# Patient Record
Sex: FEMALE | Race: WHITE | URBAN - METROPOLITAN AREA
[De-identification: names, ages, dates, MRNs, and addresses within clinical notes are randomized per-mention and may not be internally consistent; named-entity substitution may affect disease eponyms.]

---

## 2020-08-04 ENCOUNTER — TELEPHONE (OUTPATIENT)
Dept: OBSTETRICS AND GYNECOLOGY | Facility: CLINIC | Age: 45
End: 2020-08-04

## 2020-08-04 DIAGNOSIS — R30.0 DYSURIA: Primary | ICD-10-CM

## 2020-08-04 RX ORDER — SULFAMETHOXAZOLE AND TRIMETHOPRIM 400; 80 MG/1; MG/1
1 TABLET ORAL 2 TIMES DAILY
Qty: 14 TABLET | Refills: 0 | Status: SHIPPED | OUTPATIENT
Start: 2020-08-04 | End: 2020-08-11

## 2020-08-04 RX ORDER — PHENAZOPYRIDINE HYDROCHLORIDE 200 MG/1
200 TABLET, FILM COATED ORAL 3 TIMES DAILY PRN
Qty: 6 TABLET | Refills: 0 | Status: SHIPPED | OUTPATIENT
Start: 2020-08-04 | End: 2021-08-04

## 2021-08-09 ENCOUNTER — HISTORICAL (OUTPATIENT)
Dept: ADMINISTRATIVE | Facility: HOSPITAL | Age: 46
End: 2021-08-09

## 2021-08-09 LAB
ABS NEUT (OLG): 2.8 X10(3)/MCL (ref 2.1–9.2)
ALBUMIN SERPL-MCNC: 4 GM/DL (ref 3.5–5)
ALBUMIN/GLOB SERPL: 1.4 RATIO (ref 1.1–2)
ALP SERPL-CCNC: 67 UNIT/L (ref 40–150)
ALT SERPL-CCNC: 14 UNIT/L (ref 0–55)
APPEARANCE, UA: CLEAR
AST SERPL-CCNC: 16 UNIT/L (ref 5–34)
BACTERIA SPEC CULT: NORMAL /HPF
BASOPHILS # BLD AUTO: 0 X10(3)/MCL (ref 0–0.2)
BASOPHILS NFR BLD AUTO: 1 %
BILIRUB SERPL-MCNC: 0.5 MG/DL
BILIRUB UR QL STRIP: NEGATIVE
BILIRUBIN DIRECT+TOT PNL SERPL-MCNC: 0.2 MG/DL (ref 0–0.5)
BILIRUBIN DIRECT+TOT PNL SERPL-MCNC: 0.3 MG/DL (ref 0–0.8)
BUN SERPL-MCNC: 17.8 MG/DL (ref 7–18.7)
CALCIUM SERPL-MCNC: 9.3 MG/DL (ref 8.4–10.2)
CHLORIDE SERPL-SCNC: 105 MMOL/L (ref 98–107)
CHOLEST SERPL-MCNC: 206 MG/DL
CHOLEST/HDLC SERPL: 4 {RATIO} (ref 0–5)
CO2 SERPL-SCNC: 27 MMOL/L (ref 22–29)
COLOR UR: YELLOW
CREAT SERPL-MCNC: 0.75 MG/DL (ref 0.55–1.02)
DEPRECATED CALCIDIOL+CALCIFEROL SERPL-MC: 40.6 NG/ML (ref 30–80)
EOSINOPHIL # BLD AUTO: 0.1 X10(3)/MCL (ref 0–0.9)
EOSINOPHIL NFR BLD AUTO: 2 %
ERYTHROCYTE [DISTWIDTH] IN BLOOD BY AUTOMATED COUNT: 12.5 % (ref 11.5–17)
EST. AVERAGE GLUCOSE BLD GHB EST-MCNC: 111.2 MG/DL
GLOBULIN SER-MCNC: 2.8 GM/DL (ref 2.4–3.5)
GLUCOSE (UA): NEGATIVE
GLUCOSE SERPL-MCNC: 91 MG/DL (ref 74–100)
HBA1C MFR BLD: 5.5 %
HCT VFR BLD AUTO: 45.9 % (ref 37–47)
HDLC SERPL-MCNC: 53 MG/DL (ref 35–60)
HGB BLD-MCNC: 15.1 GM/DL (ref 12–16)
HGB UR QL STRIP: NEGATIVE
KETONES UR QL STRIP: NEGATIVE
LDLC SERPL CALC-MCNC: 132 MG/DL (ref 50–140)
LEUKOCYTE ESTERASE UR QL STRIP: NEGATIVE
LYMPHOCYTES # BLD AUTO: 1.5 X10(3)/MCL (ref 0.6–4.6)
LYMPHOCYTES NFR BLD AUTO: 32 %
MCH RBC QN AUTO: 32.1 PG (ref 27–31)
MCHC RBC AUTO-ENTMCNC: 32.9 GM/DL (ref 33–36)
MCV RBC AUTO: 97.7 FL (ref 80–94)
MONOCYTES # BLD AUTO: 0.4 X10(3)/MCL (ref 0.1–1.3)
MONOCYTES NFR BLD AUTO: 7 %
NEUTROPHILS # BLD AUTO: 2.8 X10(3)/MCL (ref 2.1–9.2)
NEUTROPHILS NFR BLD AUTO: 58 %
NITRITE UR QL STRIP: NEGATIVE
PH UR STRIP: 5 [PH] (ref 5–9)
PLATELET # BLD AUTO: 292 X10(3)/MCL (ref 130–400)
PMV BLD AUTO: 10.3 FL (ref 9.4–12.4)
POTASSIUM SERPL-SCNC: 4.1 MMOL/L (ref 3.5–5.1)
PROT SERPL-MCNC: 6.8 GM/DL (ref 6.4–8.3)
PROT UR QL STRIP: NEGATIVE
RBC # BLD AUTO: 4.7 X10(6)/MCL (ref 4.2–5.4)
RBC #/AREA URNS HPF: NORMAL /[HPF]
SODIUM SERPL-SCNC: 143 MMOL/L (ref 136–145)
SP GR UR STRIP: 1.02 (ref 1–1.03)
SQUAMOUS EPITHELIAL, UA: NORMAL /HPF (ref 0–4)
TRIGL SERPL-MCNC: 105 MG/DL (ref 37–140)
TSH SERPL-ACNC: 1.82 UIU/ML (ref 0.35–4.94)
UROBILINOGEN UR STRIP-ACNC: 0.2
VLDLC SERPL CALC-MCNC: 21 MG/DL
WBC # SPEC AUTO: 4.8 X10(3)/MCL (ref 4.5–11.5)
WBC #/AREA URNS HPF: NORMAL /[HPF]

## 2021-09-17 ENCOUNTER — HISTORICAL (OUTPATIENT)
Dept: ADMINISTRATIVE | Facility: HOSPITAL | Age: 46
End: 2021-09-17

## 2021-09-17 LAB
FOLATE SERPL-MCNC: 7 NG/ML (ref 7–31.4)
VIT B12 SERPL-MCNC: 482 PG/ML (ref 213–816)

## 2022-02-25 ENCOUNTER — PATIENT MESSAGE (OUTPATIENT)
Dept: OBSTETRICS AND GYNECOLOGY | Facility: CLINIC | Age: 47
End: 2022-02-25

## 2022-02-25 ENCOUNTER — TELEPHONE (OUTPATIENT)
Dept: OBSTETRICS AND GYNECOLOGY | Facility: CLINIC | Age: 47
End: 2022-02-25

## 2022-04-11 ENCOUNTER — HISTORICAL (OUTPATIENT)
Dept: ADMINISTRATIVE | Facility: HOSPITAL | Age: 47
End: 2022-04-11

## 2022-04-27 VITALS
BODY MASS INDEX: 23.85 KG/M2 | OXYGEN SATURATION: 98 % | SYSTOLIC BLOOD PRESSURE: 112 MMHG | HEIGHT: 62 IN | WEIGHT: 129.63 LBS | DIASTOLIC BLOOD PRESSURE: 78 MMHG

## 2024-02-06 ENCOUNTER — TELEPHONE (OUTPATIENT)
Dept: OBSTETRICS AND GYNECOLOGY | Facility: CLINIC | Age: 49
End: 2024-02-06
Payer: MEDICAID

## 2024-02-06 NOTE — TELEPHONE ENCOUNTER
Spoke with patient. Informed her that Dr. Fermin is not accepting new Medicaid patients at this time. Patient states that will be changing in a couple weeks. Patient to call back then to schedule an appointment.

## 2024-02-06 NOTE — TELEPHONE ENCOUNTER
----- Message from Isabela Vizcaino sent at 2/6/2024  1:27 PM CST -----  Contact: Patient  Patient was seeing Dr Gonsalez and request a call back to schedule appointment with Dr. Fermin. I did not see anything soon to schedule. Please call [phone]

## 2024-11-18 ENCOUNTER — TELEPHONE (OUTPATIENT)
Dept: OBSTETRICS AND GYNECOLOGY | Facility: CLINIC | Age: 49
End: 2024-11-18

## 2024-11-18 NOTE — TELEPHONE ENCOUNTER
----- Message from Huma sent at 11/18/2024  1:20 PM CST -----  Contact: self  [phone]  Type:  Needs Medical Advice    Who Called: Breana  Symptoms (please be specific): NP  How long has patient had these symptoms:   Pharmacy name and phone #:   Would the patient rather a call back or a response via [facility]?   [facility] Call Back Number:  [phone]  Additional Information: patient called in this afternoon to schedule with the above provider but none populated. Please call back  [phone] thanks jalen

## 2025-04-08 ENCOUNTER — TELEPHONE (OUTPATIENT)
Dept: OBSTETRICS AND GYNECOLOGY | Facility: CLINIC | Age: 50
End: 2025-04-08
Payer: COMMERCIAL

## 2025-04-08 NOTE — TELEPHONE ENCOUNTER
----- Message from Ninfa sent at 4/8/2025  3:58 PM CDT -----  Regarding: order  Contact: Elvira  Type:  MammogramCaller is requesting to schedule their annual mammogram appointment.  Order is not listed in EPIC.  Please enter order and contact patient to schedule.Name of Caller: Paz would they like the mammogram performed? Would the patient rather a call back or a response [facility]? [facility]:    [phone] Information:  Breana want her mammogram and ultrasound order to be completed the day before her visit.

## 2025-04-08 NOTE — TELEPHONE ENCOUNTER
Returned call to patient. Patient is requesting orders for a mammogram and breast ultrasound to have them done prior to her appointment with Dr. Fermin. Informed patient that she would need to be examined by Dr. Fermin prior to getting those orders. Patient has not seen an [facility] provider in at least 5 years. Patient verbalized understanding. She states she will try to schedule them the day of the appt here so that she can do them the same day.

## 2025-04-09 PROBLEM — H81.10 BENIGN PAROXYSMAL POSITIONAL VERTIGO: Status: ACTIVE | Noted: 2025-04-09

## 2025-04-09 PROBLEM — E53.8 FOLIC ACID DEFICIENCY: Status: ACTIVE | Noted: 2025-04-09

## 2025-04-09 PROBLEM — H93.13 TINNITUS OF BOTH EARS: Status: ACTIVE | Noted: 2025-04-09

## 2025-04-09 PROBLEM — R60.9 EDEMA: Status: ACTIVE | Noted: 2025-04-09

## 2025-04-09 PROBLEM — D75.89 MACROCYTOSIS WITHOUT ANEMIA: Status: ACTIVE | Noted: 2025-04-09

## 2025-04-09 PROBLEM — L30.9 ECZEMA OF LOWER LEG: Status: ACTIVE | Noted: 2025-04-09

## 2025-04-09 PROBLEM — E78.5 HYPERLIPIDEMIA: Status: ACTIVE | Noted: 2025-04-09

## 2025-04-09 PROBLEM — F32.A DEPRESSIVE DISORDER: Status: ACTIVE | Noted: 2025-04-09

## 2025-04-09 PROBLEM — G47.00 INSOMNIA: Status: ACTIVE | Noted: 2025-04-09

## 2025-04-09 PROBLEM — G25.81 RESTLESS LEGS SYNDROME: Status: ACTIVE | Noted: 2025-04-09

## 2025-04-09 PROBLEM — R53.83 FATIGUE: Status: ACTIVE | Noted: 2025-04-09

## 2025-04-10 ENCOUNTER — OFFICE VISIT (OUTPATIENT)
Dept: OBSTETRICS AND GYNECOLOGY | Facility: CLINIC | Age: 50
End: 2025-04-10
Payer: COMMERCIAL

## 2025-04-10 VITALS
WEIGHT: 146.19 LBS | SYSTOLIC BLOOD PRESSURE: 117 MMHG | DIASTOLIC BLOOD PRESSURE: 88 MMHG | BODY MASS INDEX: 27.08 KG/M2

## 2025-04-10 DIAGNOSIS — Z01.419 ENCOUNTER FOR WELL WOMAN EXAM WITH ROUTINE GYNECOLOGICAL EXAM: Primary | ICD-10-CM

## 2025-04-10 DIAGNOSIS — Z12.31 BREAST CANCER SCREENING BY MAMMOGRAM: ICD-10-CM

## 2025-04-10 DIAGNOSIS — N60.01 BILATERAL BREAST CYSTS: ICD-10-CM

## 2025-04-10 DIAGNOSIS — N60.02 BILATERAL BREAST CYSTS: ICD-10-CM

## 2025-04-10 PROCEDURE — 3079F DIAST BP 80-89 MM HG: CPT | Mod: CPTII,,, | Performed by: OBSTETRICS & GYNECOLOGY

## 2025-04-10 PROCEDURE — 99386 PREV VISIT NEW AGE 40-64: CPT | Mod: S$PBB,,, | Performed by: OBSTETRICS & GYNECOLOGY

## 2025-04-10 PROCEDURE — 3074F SYST BP LT 130 MM HG: CPT | Mod: CPTII,,, | Performed by: OBSTETRICS & GYNECOLOGY

## 2025-04-10 PROCEDURE — 1159F MED LIST DOCD IN RCRD: CPT | Mod: CPTII,,, | Performed by: OBSTETRICS & GYNECOLOGY

## 2025-04-10 PROCEDURE — 3008F BODY MASS INDEX DOCD: CPT | Mod: CPTII,,, | Performed by: OBSTETRICS & GYNECOLOGY

## 2025-04-10 RX ORDER — METFORMIN HYDROCHLORIDE 500 MG/1
500 TABLET ORAL 2 TIMES DAILY
COMMUNITY
Start: 2025-02-26

## 2025-04-10 RX ORDER — LISDEXAMFETAMINE DIMESYLATE 20 MG/1
20 CAPSULE ORAL
COMMUNITY
Start: 2025-03-03

## 2025-04-10 RX ORDER — ESTRADIOL 2 MG/1
2 TABLET ORAL NIGHTLY
COMMUNITY
Start: 2025-02-27

## 2025-04-10 RX ORDER — FLUCONAZOLE 150 MG/1
TABLET ORAL
COMMUNITY
Start: 2025-02-15 | End: 2025-04-10

## 2025-04-10 RX ORDER — FOLIC ACID 1 MG/1
1 TABLET ORAL
COMMUNITY
Start: 2022-05-06 | End: 2025-04-10

## 2025-04-10 RX ORDER — OSELTAMIVIR PHOSPHATE 75 MG/1
CAPSULE ORAL
COMMUNITY
End: 2025-04-10

## 2025-04-10 RX ORDER — AMOXICILLIN AND CLAVULANATE POTASSIUM 875; 125 MG/1; MG/1
1 TABLET, FILM COATED ORAL 2 TIMES DAILY
COMMUNITY
Start: 2025-02-19 | End: 2025-04-10

## 2025-04-10 RX ORDER — PROGESTERONE 100 MG/1
100 CAPSULE ORAL NIGHTLY
COMMUNITY
Start: 2024-12-26

## 2025-04-10 RX ORDER — CYCLOSPORINE 0.5 MG/ML
EMULSION OPHTHALMIC
COMMUNITY
End: 2025-04-10

## 2025-04-10 NOTE — PROGRESS NOTES
CC:  WELL WOMAN (menopausal since )  Patient Care Team:  Abena Coronado, NP as PCP - General (Internal Medicine)    NEW PATIENT    HPI:  Patient is a 49 y.o. who presents for her well woman exam today.  History reviewed with patient. Pt on est/prog po and test cream from abena coronado. Having difficulty with wt loss but is prediabetic and started on metformin recently. Is concerned menopause is reason for weight gain but discussed it is more likely her elevated sugar and insulin levels. Pt not wanting to use ozempic bc of her chronic constipation but will see how the metformin works . Requesting bilateral ultrasound order as well bc always hgas to go back as she has a long history of breast cysts and dense breasts. Discussed checking with her insurance to see if they will cover on same day  Patient is without complaints or concerns today.     estrogen hrt, progesterone hrt, and testosterone hrt  HX ABNL PAPS: in past-no excisional tx    REVIEW OF PRIOR DATA/ HEALTH MAINTENANCE:  LAST ANNUAL:    5+ yrs     LAST MMG-     LAST COLONOSCOPY- never -discussed r/b/a and declines all colon ca screening-will let us know if she wants a GI ref or Cologard order in future   LAST DEXA- - [facility] at [facility]     Past Medical History:   Diagnosis Date    Pre-diabetes      SURGICAL HX:   has a past surgical history that includes Breast cyst excision (Left);  section; Bilateral tubal ligation; and Nasal sinus surgery.    SOCIAL HX:    reports that she has never smoked. She has never used smokeless tobacco. She reports that she does not drink alcohol and does not use drugs.    Current Outpatient Medications   Medication Instructions    estradioL (ESTRACE) 2 mg, Nightly    lisdexamfetamine (VYVANSE) 20 mg    metFORMIN (GLUCOPHAGE) 500 mg, 2 times daily    progesterone (PROMETRIUM) 100 mg, Nightly     VITALS:  Blood pressure 117/88, weight 66.3 kg (146 lb 3.2 oz).  Body mass index is 27.08 kg/m².     PHYSICAL  EXAM-  APPEARANCE: Well appearing, in no acute distress.   CV/PULM: No resp distress, normal resp effort   PSYCH:  Normal mood and affect, cooperative   SKIN: No rashes, lesions, or abnormal bruising   ABD: Soft, without tenderness or masses.   BREAST: deferred/declined  PELVIC:  VULVA: Normal female genitalia. No lesions.   URETHRAL MEATUS: No masses, no significant prolapse.   BLADDER/ URETHRA: No masses or suprapubic tenderness   VAGINA/ CVX: No lesions. +atrophic changes. No discharge   UTERUS:  mobile, non-tender   ADNEXA: No masses, tenderness, or fullness   ANUS/ PERINEUM: Normal tone.  No lesions.           *patient verbally consented for exam and female chaperone present for entire exam     ASSESSMENT and PLAN:  Encounter for well woman exam with routine gynecological exam  -     Liquid-based pap smear, screening    Breast cancer screening by mammogram  -     Mammo Digital Screening Bilat w/ [facility] (XPD); Future; Expected date: 04/24/2025    Bilateral breast cysts  -     US Breast Bilateral Complete; Future; Expected date: 04/17/2025       FOLLOWUP:  1 year for wwe or sooner prn    COUNSELING:  Patient was counseled today on recommendation for yearly pelvic exam, current Pap guidelines, self breast exams, annual screening mammograms, routine screening colonoscopy , and bone density testing.  Discussed vitamin D and calcium supplements as well as weight bearing exercise to decrease risks. Encouraged patient to see her PCP for other health maintenance.

## 2025-04-11 ENCOUNTER — TELEPHONE (OUTPATIENT)
Dept: OBSTETRICS AND GYNECOLOGY | Facility: CLINIC | Age: 50
End: 2025-04-11
Payer: COMMERCIAL

## 2025-04-11 NOTE — TELEPHONE ENCOUNTER
----- Message from Afia sent at 4/11/2025 10:22 AM CDT -----  Type:  Needs Medical AdviceWho Called: Breana FrugeSymptoms (please be specific): - How long has patient had these symptoms:  -Pharmacy name and phone #:  -Would the patient rather a call back or a response via [facility]? [facility] Call Back Number: [phone] Information:  pt needs you to fax mammo and US orders to the facility ( across from [facility]) pt says they can't accept written orders from her

## 2025-04-16 ENCOUNTER — RESULTS FOLLOW-UP (OUTPATIENT)
Dept: OBSTETRICS AND GYNECOLOGY | Facility: CLINIC | Age: 50
End: 2025-04-16

## 2025-04-16 NOTE — PROGRESS NOTES
Please let patient know her pap and hpv are both negative    Please encourage her to set up the [facility] if she has not done so

## 2025-05-23 ENCOUNTER — TELEPHONE (OUTPATIENT)
Dept: OBSTETRICS AND GYNECOLOGY | Facility: CLINIC | Age: 50
End: 2025-05-23
Payer: COMMERCIAL

## 2025-05-23 DIAGNOSIS — R92.8 ABNORMAL MAMMOGRAM: Primary | ICD-10-CM

## 2025-05-23 NOTE — TELEPHONE ENCOUNTER
----- Message from Ninfa sent at 5/23/2025  8:03 AM CDT -----  Contact: Breana  Type:  MammogramCaller is requesting to schedule their annual mammogram appointment.  Order is not listed in EPIC.  Please enter order and contact patient to schedule.Name of Caller: Breana Where would they like the mammogram performed? [place] Would the patient rather a call back or a response via [facility]? [facility] Call Back Number: [phone] Additional Information:  Please diagnostic mammo and ultrasound.

## 2025-05-23 NOTE — TELEPHONE ENCOUNTER
Copied from CRM #[id number]. Topic: Appointments - Amb Referral  >> May 23, 2025  8:05 AM Ninfa wrote:  .Type:  Mammogram    Caller is requesting to schedule their annual mammogram appointment.  Order is not listed in EPIC.  Please enter order and contact patient to schedule.  Name of Caller: benedicto,luis armando;u   Where would they like the mammogram performed?  Would the patient rather a call back or a response via [facility]? call  [facility] Call Back Number: [phone] (home)   Additional Information:

## 2025-05-23 NOTE — TELEPHONE ENCOUNTER
Returned call to patient. Diagnostic mammogram ordered and sent to [facility]. Patient advised to call them to schedule in a couple of hours. Patient verbalized understanding.

## 2025-06-04 ENCOUNTER — RESULTS FOLLOW-UP (OUTPATIENT)
Dept: OBSTETRICS AND GYNECOLOGY | Facility: CLINIC | Age: 50
End: 2025-06-04